# Patient Record
Sex: FEMALE | HISPANIC OR LATINO | Employment: FULL TIME | URBAN - METROPOLITAN AREA
[De-identification: names, ages, dates, MRNs, and addresses within clinical notes are randomized per-mention and may not be internally consistent; named-entity substitution may affect disease eponyms.]

---

## 2023-05-08 ENCOUNTER — HOSPITAL ENCOUNTER (EMERGENCY)
Facility: HOSPITAL | Age: 33
Discharge: HOME | End: 2023-05-08
Attending: EMERGENCY MEDICINE

## 2023-05-08 VITALS
OXYGEN SATURATION: 99 % | DIASTOLIC BLOOD PRESSURE: 80 MMHG | HEART RATE: 127 BPM | RESPIRATION RATE: 18 BRPM | SYSTOLIC BLOOD PRESSURE: 141 MMHG | TEMPERATURE: 98.6 F

## 2023-05-08 DIAGNOSIS — J02.9 PHARYNGITIS: Primary | ICD-10-CM

## 2023-05-08 LAB — S PYO DNA THROAT QL NAA+PROBE: NOT DETECTED

## 2023-05-08 RX ORDER — AMOXICILLIN 500 MG/1
500 CAPSULE ORAL EVERY 12 HOURS SCHEDULED
Qty: 20 CAPSULE | Refills: 0 | Status: SHIPPED | OUTPATIENT
Start: 2023-05-08 | End: 2023-05-18

## 2023-05-08 NOTE — ED PROVIDER NOTES
History  Chief Complaint   Patient presents with   • Sore Throat     Sore throat since yesterday  Denies fevers  71-year-old female presenting today with a sore throat that began yesterday  No other symptoms currently  Able to eat and drink without difficulty, pain worsens with swallowing  Denies nausea, vomiting, headache, cough, congestion, shortness of breath, fevers  Differential includes but is not limited to viral illness, strep pharyngitis  None       Past Medical History:   Diagnosis Date   • Miscarriage        Past Surgical History:   Procedure Laterality Date   •  SECTION         History reviewed  No pertinent family history  I have reviewed and agree with the history as documented  E-Cigarette/Vaping   • E-Cigarette Use Never User      E-Cigarette/Vaping Substances   • Nicotine No    • THC No    • CBD No    • Flavoring No    • Other No    • Unknown No      Social History     Tobacco Use   • Smoking status: Former     Packs/day: 0 20     Types: Cigarettes   • Smokeless tobacco: Never   Vaping Use   • Vaping Use: Never used   Substance Use Topics   • Alcohol use: Yes     Comment: social   • Drug use: No       Review of Systems   Constitutional: Negative for appetite change, chills and fever  HENT: Positive for sore throat  Negative for congestion, dental problem, ear pain, facial swelling, mouth sores, postnasal drip, sinus pressure and trouble swallowing  Eyes: Negative  Respiratory: Negative  Negative for cough, chest tightness, shortness of breath and wheezing  Cardiovascular: Negative  Negative for chest pain  Gastrointestinal: Negative for abdominal distention, abdominal pain, blood in stool, constipation, diarrhea, nausea and vomiting  Musculoskeletal: Negative for arthralgias, neck pain and neck stiffness  Skin: Negative  Negative for rash  Neurological: Negative for facial asymmetry and headaches     All other systems reviewed and are negative  Physical Exam  Physical Exam  Vitals and nursing note reviewed  Constitutional:       General: She is not in acute distress  Appearance: She is well-developed  She is not diaphoretic  HENT:      Head: Normocephalic and atraumatic  Right Ear: External ear normal       Left Ear: External ear normal       Nose: Nose normal       Mouth/Throat:      Pharynx: No oropharyngeal exudate  Eyes:      General: No scleral icterus  Right eye: No discharge  Left eye: No discharge  Conjunctiva/sclera: Conjunctivae normal       Pupils: Pupils are equal, round, and reactive to light  Neck:      Trachea: No tracheal deviation  Comments: Bilateral cervical adenopathy noted  Cardiovascular:      Rate and Rhythm: Normal rate and regular rhythm  Heart sounds: Normal heart sounds  No murmur heard  No friction rub  Pulmonary:      Effort: Pulmonary effort is normal  No respiratory distress  Breath sounds: Normal breath sounds  No stridor  No wheezing or rales  Chest:      Chest wall: No tenderness  Abdominal:      General: Bowel sounds are normal  There is no distension  Palpations: Abdomen is soft  Tenderness: There is no abdominal tenderness  There is no guarding or rebound  Musculoskeletal:      Cervical back: Normal range of motion and neck supple  Lymphadenopathy:      Cervical: Cervical adenopathy present  Skin:     General: Skin is warm and dry  Coloration: Skin is not pale  Findings: No erythema or rash  Comments: No sandpaper rash noted  No other rashes noted  Good coloration of skin            Vital Signs  ED Triage Vitals [05/08/23 0836]   Temperature Pulse Respirations Blood Pressure SpO2   98 6 °F (37 °C) (!) 127 18 141/80 99 %      Temp Source Heart Rate Source Patient Position - Orthostatic VS BP Location FiO2 (%)   Oral Monitor Lying Right arm --      Pain Score       --           Vitals:    05/08/23 0836   BP: 141/80   Pulse: (!) 127   Patient Position - Orthostatic VS: Lying         Visual Acuity      ED Medications  Medications - No data to display    Diagnostic Studies  Results Reviewed     Procedure Component Value Units Date/Time    Strep KAYLEY PCR [[id number]] Collected: 05/08/23 0852    Lab Status: In process Specimen: Throat Updated: 05/08/23 0854                 No orders to display              Procedures  Procedures         ED Course  ED Course as of 05/08/23 0856   Mon May 08, 2023   0853  patient states she gets nervous, this is normal for her according to patient                                SBIRT 22yo+    Flowsheet Row Most Recent Value   Initial Alcohol Screen: US AUDIT-C     1  How often do you have a drink containing alcohol? 0 Filed at: 05/08/2023 0838   Audit-C Score 0 Filed at: 05/08/2023 0723                    Medical Decision Making  Patient appears well no distress however there is large concern for strep pharyngitis, will swab as patient has children at home  However will treat with amoxicillin given suspicion  Patient is informed to return to the emergency department for worsening of symptoms and was given proper education regarding their diagnosis and symptoms  Otherwise the patient is informed to follow up with their primary care doctor for re-evaluation  The patient verbalizes understanding and agrees with above assessment and plan  All questions were answered  Please Note: Fluency Direct voice recognition software may have been used in the creation of this document  Wrong words or sound a like substitutions may have occurred due to the inherent limitations of the voice software  Pharyngitis: acute illness or injury  Amount and/or Complexity of Data Reviewed  Labs: ordered  Risk  OTC drugs  Prescription drug management            Disposition  Final diagnoses:   Pharyngitis     Time reflects when diagnosis was documented in both MDM as applicable and the Disposition within this note     Time User Action Codes Description Comment    5/8/2023  8:52 AM Jeff Luna Add [J02 9] Pharyngitis       ED Disposition     ED Disposition   Discharge    Condition   Stable    Date/Time   Mon May 8, 2023  8:52 AM    Comment   Gemma Lee discharge to home/self care  Follow-up Information     Follow up With Specialties Details Why Contact Info Additional [address] Emergency Department Emergency Medicine Go to  If symptoms worsen, otherwise please follow up with your family doctor [address] [place] [address] [address] [phone] [address] Emergency Department, [place], [place], [address]          Patient's Medications   Discharge Prescriptions    AMOXICILLIN (AMOXIL) 500 MG CAPSULE    Take 1 capsule (500 mg total) by mouth every 12 (twelve) hours for 10 days       Start Date: 5/8/2023  End Date: 5/18/2023       Order Dose: 500 mg       Quantity: 20 capsule    Refills: 0       No discharge procedures on file      PDMP Review     None          ED Provider  Electronically Signed by           Amrita Lee PA-C  05/08/23 6382

## 2024-03-10 ENCOUNTER — HOSPITAL ENCOUNTER (EMERGENCY)
Facility: HOSPITAL | Age: 34
Discharge: HOME | End: 2024-03-10
Attending: EMERGENCY MEDICINE | Admitting: EMERGENCY MEDICINE

## 2024-03-10 VITALS
SYSTOLIC BLOOD PRESSURE: 145 MMHG | TEMPERATURE: 99 F | HEART RATE: 103 BPM | BODY MASS INDEX: 30.38 KG/M2 | RESPIRATION RATE: 18 BRPM | OXYGEN SATURATION: 97 % | DIASTOLIC BLOOD PRESSURE: 88 MMHG | WEIGHT: 177 LBS

## 2024-03-10 DIAGNOSIS — J02.9 PHARYNGITIS: Primary | ICD-10-CM

## 2024-03-10 LAB — S PYO DNA THROAT QL NAA+PROBE: NOT DETECTED

## 2024-03-10 PROCEDURE — 99283 EMERGENCY DEPT VISIT LOW MDM: CPT

## 2024-03-10 PROCEDURE — 99284 EMERGENCY DEPT VISIT MOD MDM: CPT | Performed by: EMERGENCY MEDICINE

## 2024-03-10 PROCEDURE — 87651 STREP A DNA AMP PROBE: CPT | Performed by: EMERGENCY MEDICINE

## 2024-03-10 RX ORDER — IBUPROFEN 400 MG/1
400 TABLET ORAL ONCE
Status: COMPLETED | OUTPATIENT
Start: 2024-03-10 | End: 2024-03-10

## 2024-03-10 RX ORDER — ACETAMINOPHEN 325 MG/1
975 TABLET ORAL ONCE
Status: COMPLETED | OUTPATIENT
Start: 2024-03-10 | End: 2024-03-10

## 2024-03-10 RX ADMIN — ACETAMINOPHEN 975 MG: 325 TABLET ORAL at 11:21

## 2024-03-10 RX ADMIN — IBUPROFEN 400 MG: 400 TABLET, FILM COATED ORAL at 11:21

## 2024-03-10 RX ADMIN — DEXAMETHASONE SODIUM PHOSPHATE 10 MG: 10 INJECTION, SOLUTION INTRAMUSCULAR; INTRAVENOUS at 11:21

## 2024-03-10 NOTE — DISCHARGE INSTRUCTIONS
Follow-up with primary care for further care. Contact info provided below if needed.  Use over the counter medications as stated on the bottle as needed for pain control.  Stay hydrated.   Return to the ED with new or worsening symptoms.

## 2024-03-10 NOTE — ED PROVIDER NOTES
History  Chief Complaint   Patient presents with    Sore Throat     Sore throat since Friday, worse yesterday. One of children tested positive for strep A earlier.     Pt is a 34yo F who presents for sore throat.  Patient reports it started 2 days ago but acutely worsened yesterday.  Patient reports it is medial and bilateral.  She denies any increased swelling or pain unilaterally.  She states that it is painful to swallow but she is able to tolerate p.o. and secretions.  Patient denies any difficulty breathing.  Patient denies any fevers or chills.  Patient reports her daughter had strep 1 to 2 weeks ago and she is concerned she may have strep throat now as well.  Patient states she is otherwise healthy.        None       Past Medical History:   Diagnosis Date    Miscarriage        Past Surgical History:   Procedure Laterality Date     SECTION         History reviewed. No pertinent family history.  I have reviewed and agree with the history as documented.    E-Cigarette/Vaping    E-Cigarette Use Never User      E-Cigarette/Vaping Substances    Nicotine No     THC No     CBD No     Flavoring No     Other No     Unknown No      Social History     Tobacco Use    Smoking status: Former     Current packs/day: 0.20     Types: Cigarettes    Smokeless tobacco: Never   Vaping Use    Vaping status: Never Used   Substance Use Topics    Alcohol use: Yes     Comment: social    Drug use: No       Review of Systems   HENT:  Positive for sore throat.    All other systems reviewed and are negative.      Physical Exam  Physical Exam  Vitals reviewed.   Constitutional:       General: She is not in acute distress.     Appearance: She is well-developed. She is not toxic-appearing or diaphoretic.   HENT:      Head: Normocephalic and atraumatic.      Right Ear: External ear normal.      Left Ear: External ear normal.      Nose: Nose normal.      Mouth/Throat:      Mouth: Mucous membranes are moist.      Tongue: Tongue does not  deviate from midline.      Pharynx: No uvula swelling.      Tonsils: Tonsillar exudate present. No tonsillar abscesses. 2+ on the right. 2+ on the left.   Eyes:      Extraocular Movements: Extraocular movements intact.      Pupils: Pupils are equal, round, and reactive to light.   Cardiovascular:      Rate and Rhythm: Normal rate and regular rhythm.      Heart sounds: Normal heart sounds. No murmur heard.  Pulmonary:      Effort: Pulmonary effort is normal. No respiratory distress.      Breath sounds: Normal breath sounds. No wheezing.   Abdominal:      General: There is no distension.      Palpations: Abdomen is soft.      Tenderness: There is no abdominal tenderness.   Musculoskeletal:         General: Normal range of motion.      Cervical back: Normal range of motion and neck supple.      Right lower leg: No edema.      Left lower leg: No edema.   Skin:     General: Skin is warm and dry.      Capillary Refill: Capillary refill takes less than 2 seconds.      Coloration: Skin is not pale.      Findings: No erythema or rash.   Neurological:      General: No focal deficit present.      Mental Status: She is alert and oriented to person, place, and time.   Psychiatric:         Speech: Speech normal.         Behavior: Behavior is cooperative.         Vital Signs  ED Triage Vitals   Temperature Pulse Respirations Blood Pressure SpO2   03/10/24 1034 03/10/24 1034 03/10/24 1034 03/10/24 1034 03/10/24 1038   99 °F (37.2 °C) 103 18 145/88 97 %      Temp Source Heart Rate Source Patient Position - Orthostatic VS BP Location FiO2 (%)   03/10/24 1034 03/10/24 1034 03/10/24 1034 03/10/24 1034 --   Oral Monitor Lying Right arm       Pain Score       03/10/24 1121       5           Vitals:    03/10/24 1034   BP: 145/88   Pulse: 103   Patient Position - Orthostatic VS: Lying         Visual Acuity      ED Medications  Medications   acetaminophen (TYLENOL) tablet 975 mg (975 mg Oral Given 3/10/24 1121)   ibuprofen (MOTRIN) tablet  400 mg (400 mg Oral Given 3/10/24 1121)   dexamethasone oral liquid 10 mg 1 mL (10 mg Oral Given 3/10/24 1121)       Diagnostic Studies  Results Reviewed       Procedure Component Value Units Date/Time    Strep A PCR [[id number]]  (Normal) Collected: 03/10/24 1040    Lab Status: Final result Specimen: Throat Updated: 03/10/24 1114     STREP A PCR Not Detected                   No orders to display              Procedures  Procedures         ED Course  ED Course as of 03/10/24 1124   Sun Mar 10, 2024   1114 STREP A PCR: Not Detected  Negative.    1120 Pt made aware of results and plan for DC after symptomatic treatment. Pt agreeable.                                SBIRT 20yo+      Flowsheet Row Most Recent Value   Initial Alcohol Screen: US AUDIT-C     1. How often do you have a drink containing alcohol? 0 Filed at: 03/10/2024 1037   Audit-C Score 0 Filed at: 03/10/2024 1037   MARTHA: How many times in the past year have you...    Used an illegal drug or used a prescription medication for non-medical reasons? Never Filed at: 03/10/2024 1037                      Medical Decision Making  Pt is a 34yo F who presents with sore throat. Exam pertinent for tonsillar hypertrophy with exudate without evidence of abscess.    Differential diagnosis to include but not limited to viral versus bacterial pharyngitis, abscess.  No evidence of abscess on exam.  Will swab for strep today.  Will treat symptomatically and reassess.  See ED course for results and details.    Plan to discharge pt with f/u to PCP. Discussed returning the ED with new or worsening of symptoms. Discussed use of over the counter medications as stated on the bottle as needed for pain. Pt expressed understanding of discharge instructions, return precautions, and medication instructions and is stable for discharge at this time. All questions were answered and pt was discharged without incident.       Amount and/or Complexity of Data Reviewed  Labs: ordered.  Decision-making details documented in ED Course.    Risk  OTC drugs.  Prescription drug management.             Disposition  Final diagnoses:   Pharyngitis     Time reflects when diagnosis was documented in both MDM as applicable and the Disposition within this note       Time User Action Codes Description Comment    3/10/2024 11:16 AM Armida Cosby Add [J02.9] Pharyngitis           ED Disposition       ED Disposition   Discharge    Condition   Stable    Date/Time   Sun Mar 10, 2024 1116    Comment   Gemma Lobb discharge to home/self care.                   Follow-up Information       Follow up With Specialties Details Why Contact Info    Esha Smith MD  Call on 3/11/2024  [address] [place] [address]  [phone]              Patient's Medications    No medications on file       No discharge procedures on file.    PDMP Review       None            ED Provider  Electronically Signed by             Armida Cosby MD  03/10/24 1122

## 2025-02-27 ENCOUNTER — TELEPHONE (OUTPATIENT)
Dept: OBGYN CLINIC | Facility: CLINIC | Age: 35
End: 2025-02-27

## 2025-02-27 NOTE — TELEPHONE ENCOUNTER
Left message for pt to call back to confirm appt on 4/21 at 830 am in [place] for Dating and Viability scan.

## 2025-03-18 ENCOUNTER — TELEPHONE (OUTPATIENT)
Age: 35
End: 2025-03-18

## 2025-03-18 NOTE — TELEPHONE ENCOUNTER
NP OB Approx 6wk--Patient complains of nausea beginning this  past weekend. Denies vomiting. Patient tolerating sips of fluids and small meals. Patient inquiring about OTC nausea treatments. Reviewed meds from pregnancy essentials guide. Essentials guide messaged to patient on MyChart.

## 2025-03-27 ENCOUNTER — TELEPHONE (OUTPATIENT)
Age: 35
End: 2025-03-27

## 2025-03-27 NOTE — TELEPHONE ENCOUNTER
"LMP 2/1/25 7 weeks 5 days   Patient states she is very nauseated, trying B6   We discussed B6 and Unisom dosages how to take meds. Increase fluids as much as possible: start with small frequent sips cold water, use electrolyte replacements such as Gatorade/ Liquid IV/ Pedialyte, cold pops.  Increase to small bland meals when tolerating fluids: rice, toast, crackers, plain noodles. No dairy, no citrus, no spices.   Seek ED of unable to urinate, unable to tolerate fluids, vomiting or any other concerning symptoms. Patient verbalzied understanding.   Snack often, and eat small meals - The best foods to eat have lots of protein or carbohydrates, but not a lot of fat. Good choices are crackers, bread, and low-fat yogurt. Avoid spicy foods.   Drink cold, clear beverages that are either fizzy or sour - Good choices are lemonade and ginger ale.   Suck on ginger-flavored lollipops.   Smell fresh lemon, mint, or orange.   Brush your teeth right after you eat.   Do not lie down right after you eat.   Take your vitamins at bedtime with a snack, not in the morning. If your vitamin contains iron, it might help to switch to a vitamin without iron.   Avoid things that make you feel sick - That might include stuffy rooms, strong smells, hot places, loud noises, or not sleeping enough. Try to figure out if some foods and drinks stay down better than others. Avoid foods and drinks that seem to make you feel sick. This is different for different people.   \"Acupressure\" bands - These are bands you can wear on your wrists. They are supposed to reduce morning or motion sickness. There is not a lot of evidence that these work, but some people feel better if they wear them.          "

## 2025-04-01 NOTE — PROGRESS NOTES
S: 34 y.o.   who presents for viability scan with LMP of 25. She is 8 weeks and 4 days by her LMP. She reports some nausea but otherwise doing ok. She denies cramping or vaginal bleeding.     Past Medical History:   Diagnosis Date   • Miscarriage        OB History    Para Term  AB Living   5 2 2  2 2   SAB IAB Ectopic Multiple Live Births   2    2      # Outcome Date GA Lbr Kuldeep/2nd Weight Sex Type Anes PTL Lv   5 Current            4 Term 10/04/18     CS-Unspec   ADY   3 SAB 2017 7w0d             Birth Comments: no D&C   2 2014 8w0d             Birth Comments: no D&C   1 Term 13 38w0d  3374 g (7 lb 7 oz)  CS-LTranv  N ADY      Birth Comments: Induced for PreEclampsia; C/S for NR FHR, nuchal cord      Complications: Pre-eclampsia        O:  Vitals:    25 1033   BP: 120/80   Weight: 85.3 kg (188 lb)        TVUS: viable, rodríguez IUP at 8 weeks 4 days with CRL 21mm. FHT 182bpm. MIGDALIA 25. Final dating via LMP.                            A/P:  #1. IUP at 8 weeks and 4 days  - Viable pregnancy on TVUS  - RTC in 1-2 week for nurse intake visit    Problem List Items Addressed This Visit    None  Visit Diagnoses       Amenorrhea    -  Primary    Relevant Orders    [facility] US OB < 14 weeks single or first gestation level 1 (Completed)      Pregnancy at early stage        Relevant Orders    Ambulatory Referral to Maternal Fetal Medicine          Jeni Leonard PA-C  OB/GYN  2025  11:04 AM

## 2025-04-02 ENCOUNTER — ULTRASOUND (OUTPATIENT)
Dept: OBGYN CLINIC | Facility: CLINIC | Age: 35
End: 2025-04-02
Payer: COMMERCIAL

## 2025-04-02 VITALS — SYSTOLIC BLOOD PRESSURE: 120 MMHG | DIASTOLIC BLOOD PRESSURE: 80 MMHG | WEIGHT: 188 LBS | BODY MASS INDEX: 32.27 KG/M2

## 2025-04-02 DIAGNOSIS — N91.2 AMENORRHEA: Primary | ICD-10-CM

## 2025-04-02 DIAGNOSIS — Z34.90 PREGNANCY AT EARLY STAGE: ICD-10-CM

## 2025-04-02 PROCEDURE — 99214 OFFICE O/P EST MOD 30 MIN: CPT | Performed by: PHYSICIAN ASSISTANT

## 2025-04-02 PROCEDURE — 76817 TRANSVAGINAL US OBSTETRIC: CPT | Performed by: PHYSICIAN ASSISTANT

## 2025-04-02 RX ORDER — PYRIDOXINE HCL (VITAMIN B6) 25 MG
25 TABLET ORAL DAILY
COMMUNITY

## 2025-04-07 ENCOUNTER — PATIENT MESSAGE (OUTPATIENT)
Dept: OBGYN CLINIC | Facility: CLINIC | Age: 35
End: 2025-04-07

## 2025-04-07 ENCOUNTER — INITIAL PRENATAL (OUTPATIENT)
Dept: OBGYN CLINIC | Facility: CLINIC | Age: 35
End: 2025-04-07
Payer: COMMERCIAL

## 2025-04-07 VITALS — HEIGHT: 64 IN | WEIGHT: 180 LBS | BODY MASS INDEX: 30.73 KG/M2

## 2025-04-07 DIAGNOSIS — Z86.32 HX OF GESTATIONAL DIABETES IN PRIOR PREGNANCY, CURRENTLY PREGNANT: ICD-10-CM

## 2025-04-07 DIAGNOSIS — Z34.81 PRENATAL CARE, SUBSEQUENT PREGNANCY, FIRST TRIMESTER: Primary | ICD-10-CM

## 2025-04-07 DIAGNOSIS — Z87.59 HISTORY OF PRE-ECLAMPSIA: ICD-10-CM

## 2025-04-07 DIAGNOSIS — O09.299 HX OF GESTATIONAL DIABETES IN PRIOR PREGNANCY, CURRENTLY PREGNANT: ICD-10-CM

## 2025-04-07 PROCEDURE — 99211 OFF/OP EST MAY X REQ PHY/QHP: CPT

## 2025-04-07 NOTE — PATIENT INSTRUCTIONS
Congratulations!! Please review our Pregnancy Essential Guide and [facility] L&D Virtual tour from our networks website.     [facility] Pregnancy Essentials Guide  [facility] Women[facility] ([email or web address])     [facility] - Virtual Tour ([email or web address])     Check out “Baby & Me Hospital Readiness Class” from [facility] on [employer].   The video is available for your viewing pleasure at [email or web address]

## 2025-04-07 NOTE — PROGRESS NOTES
OB INTAKE INTERVIEW  Patient is 34 y.o. who presents for OB intake at 9 wks  She is accompanied by herself during this encounter  The father of her baby (Evans Lee) is involved in the pregnancy and is 32 years old.      Last Menstrual Period: 25  Ultrasound: Measured 8 weeks 4 days on   Estimated Date of Delivery: 25 confirmed by 8 week US    Signs/Symptoms of Pregnancy  Current pregnancy symptoms: nausea, fatigue  Constipation YES  Headaches no  Cramping/spotting no  PICA cravings no    Diabetes-  Body mass index is 30.9 kg/m².  If patient has 1 or more, please order early 1 hour GTT  History of GDM YES  BMI >35 no  History of PCOS or current metformin use (should stop for 7 days prior to 1hr GTT unless pre-existing diabetes)  no  History of LGA/macrosomic infant (4000g/9lbs) no    If patient has 2 or more, please order early 1 hour GTT  BMI>30 YES  AMA no  First degree relative with type 2 diabetes no  History of chronic HTN, hyperlipidemia, elevated A1C no  High risk race (, , ,  or ) no    Hypertension- if you answer yes to any of the following, please order baseline preeclampsia labs (cbc, comprehensive metabolic panel, urine protein creatinine ratio, uric acid)  History of of chronic HTN no  History of gestational HTN no  History of preeclampsia, eclampsia, or HELLP syndrome YES  History of diabetes no  History of lupus,sjogrens syndrome, kidney disease no    Thyroid- if yes order TSH with reflex T4  History of thyroid disease no    Bleeding Disorder or Hx of DVT-patient or first degree relative with history of. Order the following if not done previously.   (Factor V, antithrombin III, prothrombin gene mutation, protein C and S Ag, lupus anticoagulant, anticardiolipin, beta-2 glycoprotein)   no    OB/GYN-  History of abnormal pap smear YES       Date of last pap smear 2019  History of HPV no  History of Herpes/HSV no  History of other  STI (gonorrhea, chlamydia, trich) no  History of prior  no  History of prior  YES  History of  delivery prior to 36 weeks 6 days no  History of Varicella or Vaccination had vaccines  History of blood transfusion no  Ok for blood transfusion yes    Substance screening-   History of tobacco use YES  Currently using tobacco no  Substance Use Screen Level (N/A, LOW, HIGH) NA    MRSA Screening-   Does the pt have a hx of MRSA? no    Immunizations:  Influenza vaccine given this season not in   Discussed Tdap vaccine yes  Discussed COVID Vaccine no    Genetic/MFM-  Do you or your partner have a history of any of the following in yourselves or first degree relatives?  Cystic fibrosis no  Spinal muscular atrophy no  Hemoglobinopathy/Sickle Cell/Thalassemia no  Fragile X Intellectual Disability no    If yes, discuss Carrier Screening and recommend consultation with [facility]/Genetic Counseling and place specific [facility] Referral for.    If no, discuss Carrier Screening being completed once in a lifetime as a standard of care lab test. Place orders for Cystic Fibrosis Gene Test (QNS165) and Spinal Muscular Atrophy DNA ([id number])      Appointment for Nuchal Translucency Ultrasound at [facility] scheduled for       Interview education  [facility] Pregnancy Essentials Book reviewed, discussed and attached to their AVS yes    Nurse/Family Partnership- patient may qualify no; referral placed no    Prenatal lab work scripts yes  Extra labs ordered:  1 hr gtt  PreE    Aspirin/Preeclampsia Screen    Risk Level Risk Factor Recommendation   LOW Prior Uncomplicated full-term delivery YES No Aspirin recommendation        MODERATE Nulliparity no Recommend low-dose aspirin if     BMI>30 YES 2 or more moderate risk factors    Family History Preeclampsia (mother/sister) no     35yr old or greater no     Black Race, Concern for SDOH/Low Socioeconomic no     IVF Pregnancy  no     Personal History Risks (low birth weight, prior adverse  preg outcome, >10yr preg interval) no         HIGH History of Preeclampsia YES Recommend low-dose aspirin if     Multifetal gestation no 1 or more high risk factors    Chronic HTN no     Type 1 or 2 Diabetes no     Renal Disease no     Autoimmune Disease  no      Contraindications to ASA therapy:  NSAID/ ASA allergy: no  Nasal polyps: no  Asthma with history of ASA induced bronchospasm: no  Relative contraindications:  History of GI bleed: no  Active peptic ulcer disease: no  Severe hepatic dysfunction: no    Patient should be recommended to take ASA 162mg during this pregnancy from 12-36wks to lower her risk of preeclampsia: High Risk Criteria - aware to start ASA therapy at 12 weeks.           The patient has a history now or in prior pregnancy notable for:  pre-clampsia, gestational DM, and EPDS- 5 hx of anxiety no medications.       Details that I feel the provider should be aware of: This is a planned and welcomed pregnancy for Gemma and her significant other Evans.  She is a new patient to [facility]. This is their 3rd child together, they  have 2 boys Evans (12) and Gaetano (6) both born via . She had gDM with both pregnancies and preE with the first. She is overall feeling well so far, some nausea and fatigue. Patient is aware of PN1 labs and to have them completed before her next appointment. Patient aware of early glucola testing and it to be repeated in 3rd Trimester if WNL during 1st.  Blue folder was  given and reviewed today.     PN1 visit scheduled. The patient was oriented to our practice, the navigator role, reviewed delivering physicians and [facility] for Delivery. All questions were answered.    Interviewed by: Francine Chua RN

## 2025-04-18 ENCOUNTER — APPOINTMENT (OUTPATIENT)
Dept: LAB | Facility: HOSPITAL | Age: 35
End: 2025-04-18
Attending: PHYSICIAN ASSISTANT
Payer: COMMERCIAL

## 2025-04-18 DIAGNOSIS — Z86.32 HX OF GESTATIONAL DIABETES IN PRIOR PREGNANCY, CURRENTLY PREGNANT: ICD-10-CM

## 2025-04-18 DIAGNOSIS — Z34.81 PRENATAL CARE, SUBSEQUENT PREGNANCY, FIRST TRIMESTER: ICD-10-CM

## 2025-04-18 DIAGNOSIS — Z87.59 HISTORY OF PRE-ECLAMPSIA: Primary | ICD-10-CM

## 2025-04-18 DIAGNOSIS — O09.299 HX OF GESTATIONAL DIABETES IN PRIOR PREGNANCY, CURRENTLY PREGNANT: ICD-10-CM

## 2025-04-18 LAB
ABO GROUP BLD: NORMAL
ALBUMIN SERPL BCG-MCNC: 3.8 G/DL (ref 3.5–5)
ALP SERPL-CCNC: 50 U/L (ref 34–104)
ALT SERPL W P-5'-P-CCNC: 22 U/L (ref 7–52)
ANION GAP SERPL CALCULATED.3IONS-SCNC: 9 MMOL/L (ref 4–13)
AST SERPL W P-5'-P-CCNC: 16 U/L (ref 13–39)
BACTERIA UR QL AUTO: ABNORMAL /HPF
BASOPHILS # BLD AUTO: 0.02 THOUSANDS/ÂΜL (ref 0–0.1)
BASOPHILS NFR BLD AUTO: 0 % (ref 0–1)
BILIRUB SERPL-MCNC: 0.3 MG/DL (ref 0.2–1)
BILIRUB UR QL STRIP: NEGATIVE
BLD GP AB SCN SERPL QL: NEGATIVE
BUN SERPL-MCNC: 5 MG/DL (ref 5–25)
CALCIUM SERPL-MCNC: 8.7 MG/DL (ref 8.4–10.2)
CHLORIDE SERPL-SCNC: 107 MMOL/L (ref 96–108)
CLARITY UR: CLEAR
CO2 SERPL-SCNC: 18 MMOL/L (ref 21–32)
COLOR UR: YELLOW
CREAT SERPL-MCNC: 0.45 MG/DL (ref 0.6–1.3)
CREAT UR-MCNC: 146 MG/DL
EOSINOPHIL # BLD AUTO: 0.06 THOUSAND/ÂΜL (ref 0–0.61)
EOSINOPHIL NFR BLD AUTO: 1 % (ref 0–6)
ERYTHROCYTE [DISTWIDTH] IN BLOOD BY AUTOMATED COUNT: 12.6 % (ref 11.6–15.1)
GFR SERPL CREATININE-BSD FRML MDRD: 132 ML/MIN/1.73SQ M
GLUCOSE 1H P 50 G GLC PO SERPL-MCNC: 143 MG/DL (ref 70–134)
GLUCOSE SERPL-MCNC: 143 MG/DL (ref 65–140)
GLUCOSE UR STRIP-MCNC: ABNORMAL MG/DL
HBV SURFACE AB SER-ACNC: 11.5 MIU/ML
HBV SURFACE AG SER QL: NORMAL
HCT VFR BLD AUTO: 39.6 % (ref 34.8–46.1)
HCV AB SER QL: NORMAL
HGB BLD-MCNC: 13.2 G/DL (ref 11.5–15.4)
HGB UR QL STRIP.AUTO: NEGATIVE
HIV 1+2 AB+HIV1 P24 AG SERPL QL IA: NORMAL
IMM GRANULOCYTES # BLD AUTO: 0.01 THOUSAND/UL (ref 0–0.2)
IMM GRANULOCYTES NFR BLD AUTO: 0 % (ref 0–2)
KETONES UR STRIP-MCNC: NEGATIVE MG/DL
LEUKOCYTE ESTERASE UR QL STRIP: NEGATIVE
LYMPHOCYTES # BLD AUTO: 1.24 THOUSANDS/ÂΜL (ref 0.6–4.47)
LYMPHOCYTES NFR BLD AUTO: 17 % (ref 14–44)
MCH RBC QN AUTO: 28.9 PG (ref 26.8–34.3)
MCHC RBC AUTO-ENTMCNC: 33.3 G/DL (ref 31.4–37.4)
MCV RBC AUTO: 87 FL (ref 82–98)
MONOCYTES # BLD AUTO: 0.4 THOUSAND/ÂΜL (ref 0.17–1.22)
MONOCYTES NFR BLD AUTO: 6 % (ref 4–12)
NEUTROPHILS # BLD AUTO: 5.57 THOUSANDS/ÂΜL (ref 1.85–7.62)
NEUTS SEG NFR BLD AUTO: 76 % (ref 43–75)
NITRITE UR QL STRIP: NEGATIVE
NON-SQ EPI CELLS URNS QL MICRO: ABNORMAL /HPF
NRBC BLD AUTO-RTO: 0 /100 WBCS
PH UR STRIP.AUTO: 5.5 [PH]
PLATELET # BLD AUTO: 273 THOUSANDS/UL (ref 149–390)
PMV BLD AUTO: 9.5 FL (ref 8.9–12.7)
POTASSIUM SERPL-SCNC: 3.3 MMOL/L (ref 3.5–5.3)
PROT SERPL-MCNC: 7.1 G/DL (ref 6.4–8.4)
PROT UR STRIP-MCNC: ABNORMAL MG/DL
PROT UR-MCNC: 20.3 MG/DL
PROT/CREAT UR: 0.1 MG/G{CREAT}
RBC # BLD AUTO: 4.56 MILLION/UL (ref 3.81–5.12)
RBC #/AREA URNS AUTO: ABNORMAL /HPF
RH BLD: POSITIVE
RUBV IGG SERPL IA-ACNC: <10 IU/ML
SODIUM SERPL-SCNC: 134 MMOL/L (ref 135–147)
SP GR UR STRIP.AUTO: >=1.03 (ref 1–1.03)
SPECIMEN EXPIRATION DATE: NORMAL
TREPONEMA PALLIDUM IGG+IGM AB [PRESENCE] IN SERUM OR PLASMA BY IMMUNOASSAY: NORMAL
URATE SERPL-MCNC: 2.2 MG/DL (ref 2–7.5)
UROBILINOGEN UR STRIP-ACNC: <2 MG/DL
WBC # BLD AUTO: 7.3 THOUSAND/UL (ref 4.31–10.16)
WBC #/AREA URNS AUTO: ABNORMAL /HPF

## 2025-04-18 PROCEDURE — 81001 URINALYSIS AUTO W/SCOPE: CPT

## 2025-04-18 PROCEDURE — 86900 BLOOD TYPING SEROLOGIC ABO: CPT

## 2025-04-18 PROCEDURE — 87086 URINE CULTURE/COLONY COUNT: CPT

## 2025-04-18 PROCEDURE — 86803 HEPATITIS C AB TEST: CPT

## 2025-04-18 PROCEDURE — 85025 COMPLETE CBC W/AUTO DIFF WBC: CPT

## 2025-04-18 PROCEDURE — 86901 BLOOD TYPING SEROLOGIC RH(D): CPT

## 2025-04-18 PROCEDURE — 82950 GLUCOSE TEST: CPT

## 2025-04-18 PROCEDURE — 84156 ASSAY OF PROTEIN URINE: CPT

## 2025-04-18 PROCEDURE — 36415 COLL VENOUS BLD VENIPUNCTURE: CPT

## 2025-04-18 PROCEDURE — 86780 TREPONEMA PALLIDUM: CPT

## 2025-04-18 PROCEDURE — 82570 ASSAY OF URINE CREATININE: CPT

## 2025-04-18 PROCEDURE — 87340 HEPATITIS B SURFACE AG IA: CPT

## 2025-04-18 PROCEDURE — 87389 HIV-1 AG W/HIV-1&-2 AB AG IA: CPT

## 2025-04-18 PROCEDURE — 86850 RBC ANTIBODY SCREEN: CPT

## 2025-04-18 PROCEDURE — 80053 COMPREHEN METABOLIC PANEL: CPT

## 2025-04-18 PROCEDURE — 86762 RUBELLA ANTIBODY: CPT

## 2025-04-18 PROCEDURE — 86706 HEP B SURFACE ANTIBODY: CPT

## 2025-04-18 PROCEDURE — 84550 ASSAY OF BLOOD/URIC ACID: CPT

## 2025-04-19 LAB — BACTERIA UR CULT: NORMAL

## 2025-04-23 ENCOUNTER — RESULTS FOLLOW-UP (OUTPATIENT)
Dept: LABOR AND DELIVERY | Facility: HOSPITAL | Age: 35
End: 2025-04-23

## 2025-04-23 DIAGNOSIS — R73.09 ABNORMAL GLUCOSE: Primary | ICD-10-CM

## 2025-04-24 PROBLEM — Z86.32 HISTORY OF GESTATIONAL DIABETES MELLITUS (GDM): Status: ACTIVE | Noted: 2025-04-24

## 2025-04-24 PROBLEM — Z34.81 PRENATAL CARE, SUBSEQUENT PREGNANCY IN FIRST TRIMESTER: Status: ACTIVE | Noted: 2025-04-24

## 2025-04-24 NOTE — PROGRESS NOTES
OB/GYN  PN Visit  Gemma Lee  [id number]  2025  2:26 PM  CASSI Ibrahim    S: 34 y.o.  11w6d here for PN visit. Her pregnancy is complicated by listed below.    She denies contractions. She denies leakage of fluid and vaginal bleeding.   She denies  fetal movement.   She denies vomiting, headache, cramping, edema, and smoking.   Patient feels safe at home.    She reports occasional nausea and headaches.       O:  Pre- Vitals      Flowsheet Row Most Recent Value   Prenatal Assessment    Fetal Heart Rate 165   Prenatal Vitals    Blood Pressure 120/82   Weight - Scale 84.6 kg (186 lb 9.6 oz)   Urine Albumin/Glucose    Dilation/Effacement/Station    Vaginal Drainage    Edema           Wt=84.6 kg (186 lb 9.6 oz); Body mass index is 32.03 kg/m².; TWG=2.994 kg (6 lb 9.6 oz)    General: Well appearing, no distress.  OB exam completed: fundal height, +FHT.  Urine: -/-     A/P:    Problem List Items Addressed This Visit       Prenatal care, subsequent pregnancy in first trimester - Primary    - Continue PNV  - Labor precautions reviewed  - Fetal movement reviewed  - Labs: UTD, advised to complete 3 hr gtt  -Pap: collected with cultures  -Rh: A+  - Genetics: [facility]   - Ultrasounds: [facility]   - Tdap: 28 weeks  - Flu Shot: Will offer in season  - RSV:  Will offer during season (-) @ [id number]   - Rhogam: n/a  - Delivery: RLTCS  - Contraception: TBD  - Breastfeeding: TBD  - Pediatrician: TBD  -Delivery Consent & Packet: 30 weeks  -GBS: 36 weeks  - RTO in 4 weeks           Relevant Orders    IGP, Aptima HPV, Rfx 16/18,45    Ct, Ng, Trich vag by CHANELLE    History of gestational diabetes mellitus (GDM)    Early 1 hr abnormal, 3 hr ordered            CASSI Ibrahim  2025  2:26 PM

## 2025-04-24 NOTE — ASSESSMENT & PLAN NOTE
- Continue PNV  - Labor precautions reviewed  - Fetal movement reviewed  - Labs: UTD, advised to complete 3 hr gtt  -Pap: collected with cultures  -Rh: A+  - Genetics: [facility] 4/30  - Ultrasounds: [facility] 4/30  - Tdap: 28 weeks  - Flu Shot: Will offer in season  - RSV:  Will offer during season (9/1-1/31) @ [id number]   - Rhogam: n/a  - Delivery: RLTCS  - Contraception: TBD  - Breastfeeding: TBD  - Pediatrician: TBD  -Delivery Consent & Packet: 30 weeks  -GBS: 36 weeks  - RTO in 4 weeks

## 2025-04-25 ENCOUNTER — INITIAL PRENATAL (OUTPATIENT)
Dept: OBGYN CLINIC | Facility: CLINIC | Age: 35
End: 2025-04-25
Payer: COMMERCIAL

## 2025-04-25 VITALS — BODY MASS INDEX: 32.03 KG/M2 | WEIGHT: 186.6 LBS | SYSTOLIC BLOOD PRESSURE: 120 MMHG | DIASTOLIC BLOOD PRESSURE: 82 MMHG

## 2025-04-25 DIAGNOSIS — Z34.81 PRENATAL CARE, SUBSEQUENT PREGNANCY IN FIRST TRIMESTER: Primary | ICD-10-CM

## 2025-04-25 DIAGNOSIS — Z86.32 HISTORY OF GESTATIONAL DIABETES MELLITUS (GDM): ICD-10-CM

## 2025-04-25 PROCEDURE — 99213 OFFICE O/P EST LOW 20 MIN: CPT

## 2025-04-25 RX ORDER — ASPIRIN 81 MG/1
81 TABLET, CHEWABLE ORAL 2 TIMES DAILY
COMMUNITY

## 2025-04-27 ENCOUNTER — RESULTS FOLLOW-UP (OUTPATIENT)
Dept: OBGYN CLINIC | Facility: CLINIC | Age: 35
End: 2025-04-27

## 2025-04-27 ENCOUNTER — APPOINTMENT (OUTPATIENT)
Dept: LAB | Facility: HOSPITAL | Age: 35
End: 2025-04-27
Payer: COMMERCIAL

## 2025-04-27 DIAGNOSIS — R73.09 ABNORMAL GLUCOSE: ICD-10-CM

## 2025-04-27 LAB
C TRACH RRNA SPEC QL NAA+PROBE: NEGATIVE
GLUCOSE 1H P 100 G GLC PO SERPL-MCNC: 146 MG/DL (ref 65–179)
GLUCOSE 2H P 100 G GLC PO SERPL-MCNC: 137 MG/DL (ref 65–154)
GLUCOSE 3H P 100 G GLC PO SERPL-MCNC: 79 MG/DL (ref 65–139)
GLUCOSE P FAST SERPL-MCNC: 90 MG/DL (ref 65–94)
N GONORRHOEA RRNA SPEC QL NAA+PROBE: NEGATIVE
T VAGINALIS RRNA SPEC QL NAA+PROBE: NEGATIVE

## 2025-04-27 PROCEDURE — 36415 COLL VENOUS BLD VENIPUNCTURE: CPT

## 2025-04-27 PROCEDURE — 82952 GTT-ADDED SAMPLES: CPT

## 2025-04-27 PROCEDURE — 82951 GLUCOSE TOLERANCE TEST (GTT): CPT

## 2025-04-28 ENCOUNTER — RESULTS FOLLOW-UP (OUTPATIENT)
Dept: OBGYN CLINIC | Facility: CLINIC | Age: 35
End: 2025-04-28

## 2025-04-28 LAB
CYTOLOGIST CVX/VAG CYTO: NORMAL
DX ICD CODE: NORMAL
HPV GENOTYPE REFLEX: NORMAL
HPV I/H RISK 4 DNA CVX QL PROBE+SIG AMP: NEGATIVE
OTHER STN SPEC: NORMAL
PATH REPORT.FINAL DX SPEC: NORMAL
SL AMB NOTE:: NORMAL
SL AMB SPECIMEN ADEQUACY: NORMAL
SL AMB TEST METHODOLOGY: NORMAL

## 2025-04-30 ENCOUNTER — ROUTINE PRENATAL (OUTPATIENT)
Facility: HOSPITAL | Age: 35
End: 2025-04-30
Attending: PHYSICIAN ASSISTANT
Payer: COMMERCIAL

## 2025-04-30 VITALS
WEIGHT: 186.6 LBS | DIASTOLIC BLOOD PRESSURE: 70 MMHG | BODY MASS INDEX: 31.86 KG/M2 | SYSTOLIC BLOOD PRESSURE: 134 MMHG | HEIGHT: 64 IN | HEART RATE: 113 BPM

## 2025-04-30 DIAGNOSIS — Z36.82 ENCOUNTER FOR NUCHAL TRANSLUCENCY TESTING: ICD-10-CM

## 2025-04-30 DIAGNOSIS — O34.219 PREVIOUS CESAREAN DELIVERY, ANTEPARTUM: ICD-10-CM

## 2025-04-30 DIAGNOSIS — Z33.1 PREGNANT STATE, INCIDENTAL: ICD-10-CM

## 2025-04-30 DIAGNOSIS — Z36.0 ENCOUNTER FOR ANTENATAL SCREENING FOR CHROMOSOMAL ANOMALIES: ICD-10-CM

## 2025-04-30 DIAGNOSIS — Z3A.12 12 WEEKS GESTATION OF PREGNANCY: Primary | ICD-10-CM

## 2025-04-30 DIAGNOSIS — Z34.90 PREGNANCY AT EARLY STAGE: ICD-10-CM

## 2025-04-30 PROCEDURE — 76801 OB US < 14 WKS SINGLE FETUS: CPT | Performed by: OBSTETRICS & GYNECOLOGY

## 2025-04-30 PROCEDURE — 99203 OFFICE O/P NEW LOW 30 MIN: CPT | Performed by: OBSTETRICS & GYNECOLOGY

## 2025-04-30 PROCEDURE — 76813 OB US NUCHAL MEAS 1 GEST: CPT | Performed by: OBSTETRICS & GYNECOLOGY

## 2025-04-30 PROCEDURE — 36415 COLL VENOUS BLD VENIPUNCTURE: CPT | Performed by: OBSTETRICS & GYNECOLOGY

## 2025-04-30 NOTE — PROGRESS NOTES
"Gemma presents today for a genetic screening ultrasound.  This is her fifth pregnancy.  She has a history of 2 first trimester miscarriages not requiring surgical intervention.  She had 2 term  sections her first which was complicated by preeclampsia and A1 gestational diabetes.  Her second delivery in 2018 was also complicated by gestational diabetes not requiring medication.  She is currently taking low-dose aspirin to reduce risk for preeclampsia.  Early gestational diabetes screening was normal.    We discussed the options for genetic screening, including but not limited to first trimester screening, second trimester screening, combined first and second trimester screening, noninvasive prenatal screening (NIPS) for patients at high risk and diagnostic screening through the use of CVS and amniocentesis. We discussed the risks and benefits of each approach including the sensitivities and false positive rates as well as the difference between a screening test and a diagnostic test. At the conclusion of our discussion the patient elected noninvasive prenatal screening utilizing the MaterniT 21 plus test. The patient had this blood work drawn in the office.   The results should be available in approximately 7-10 days.    We discussed follow-up in detail and I recommend an anatomy ultrasound be scheduled for 20 weeks gestation.    Thank you very much for allowing us to participate in the care of this very nice patient. Should you have any questions, please do not hesitate to contact me.    Portions of the record may have been created with voice recognition software. Occasional wrong word or \"sound a like\" substitutions may have occurred due to the inherent limitations of voice recognition software. Read the chart carefully and recognize, using context, where substitutions have occurred.  "

## 2025-04-30 NOTE — PROGRESS NOTES
Patient chose to have [facility] [id number] Non-Invasive Prenatal Screen [id number] PLUS w/ SCA, WITH fetal sex.  Patient choose to be billed through insurance.     Patient given brochure and is aware [facility] will contact patient's insurance and coordinate coverage.  Provided [facility] contact information. General inquiries [phone], Cost estimates [phone] and [facility] Billing [phone]. Website [email or web address].     Blood collection tubes labeled with patient identifiers (name, medical record number, and date of birth).     Filled out [facility] order form. Patient chose to have blood drawn in our office at time of visit. NIPS was drawn from right arm with a butterfly needle by Michaelle BALL MA.       If patient chose to have blood work drawn at a [facility] lab we requested patient notify MFM (via phone call or [employer] message) when blood collected so office can follow up on results.       Maternal Fetal Medicine will have results in approximately 5-7 business days and will call patient or notify via [employer].  Patient aware viewing lab result online will reveal fetal sex if ordered.    Patient verbalized understanding of all instructions and no questions at this time.

## 2025-05-05 ENCOUNTER — RESULTS FOLLOW-UP (OUTPATIENT)
Dept: PERINATAL CARE | Facility: OTHER | Age: 35
End: 2025-05-05

## 2025-05-05 LAB
CFDNA.FET/CFDNA.TOTAL SFR FETUS: NORMAL %
CFDNA.FET/CFDNA.TOTAL SFR FETUS: NORMAL %
CITATION REF LAB TEST: NORMAL
FET 13+18+21+X+Y ANEUP PLAS.CFDNA: NEGATIVE
FET CHR 21 TS PLAS.CFDNA QL: NEGATIVE
FET CHR 21 TS PLAS.CFDNA QL: NEGATIVE
FET MS X RISK WBC.DNA+CFDNA QL: NOT DETECTED
FET SEX PLAS.CFDNA DOSAGE CFDNA: NORMAL
FET TS 13 RISK PLAS.CFDNA QL: NEGATIVE
FET X + Y ANEUP RISK PLAS.CFDNA SEQ-IMP: NOT DETECTED
GA EST FROM CONCEPTION DATE: NORMAL D
GESTATIONAL AGE > 9:: YES
LAB DIRECTOR NAME PROVIDER: NORMAL
LABORATORY COMMENT REPORT: NORMAL
LIMITATIONS OF THE TEST: NORMAL
NEGATIVE PREDICTIVE VALUE: NORMAL
PERFORMANCE CHARACTERISTICS: NORMAL
POSITIVE PREDICTIVE VALUE: NORMAL
REF LAB TEST METHOD: NORMAL
SL AMB NOTE:: NORMAL
TEST PERFORMANCE INFO SPEC: NORMAL

## 2025-05-05 NOTE — RESULT ENCOUNTER NOTE
I have reviewed the results of the NIPS which are low risk.  Please call patient and notify her of these reassuring results if she has not viewed on MyChart. Please ensure she is notified of recommendation of MSAFP to be ordered and followed up through her primary Obstetrician's office.      Thank you, Douglas Ashraf MD

## 2025-05-12 NOTE — ASSESSMENT & PLAN NOTE
- Continue PNV and LDASA  - Labor precautions reviewed  - Fetal movement reviewed  - Labs: UTD, 3 hour GTT normal. Will repeat at 28 weeks  -Pap: UTD  -Rh: A+  - Genetics: NIPS low risk, AFP ordered today   - Ultrasounds: L! Normal. L2 scheduled   - Tdap: 28 weeks  - Flu Shot: Will offer in season  - RSV:  Will offer during season (9/1-1/31) @ [id number]   - Rhogam: n/a  - Delivery: RLTCS  - Contraception: TBD  - Breastfeeding: TBD  - Pediatrician: TBD  -Delivery Consent & Packet: 30 weeks  -GBS: 36 weeks  - RTO in 4 weeks

## 2025-05-12 NOTE — PROGRESS NOTES
OB/GYN  PN Visit  Gemma Lee  [id number]  2025  2:37 PM  CASSI Lou    S: 34 y.o.  14w3d here for PN visit.   She denies contractions. She denies leakage of fluid and vaginal bleeding.   No fetal movement yet.   Her pregnancy is complicated by history of GDM, history of pre-eclampsia, and history of C/S x2.     O:  Pre-Mumtaz Vitals      Flowsheet Row Most Recent Value   Prenatal Assessment    Fetal Heart Rate 150   Movement Absent   Prenatal Vitals    Blood Pressure 120/74   Weight - Scale 85.7 kg (189 lb)   Urine Albumin/Glucose    Dilation/Effacement/Station    Vaginal Drainage    Edema           Wt=85.7 kg (189 lb); Body mass index is 32.44 kg/m².; TWG=4.082 kg (9 lb)  Physical Exam    General: Well appearing, no distress  Respiratory: Unlabored breathing  Abdomen: Soft, gravid, nontender  Extremities: Warm and well perfused.  Non tender.  OB exam completed:  +FHT.  Urine: -/-    A/P:    Problem List Items Addressed This Visit       Prenatal care, subsequent pregnancy in first trimester    - Continue PNV and LDASA  - Labor precautions reviewed  - Fetal movement reviewed  - Labs: UTD, 3 hour GTT normal. Will repeat at 28 weeks  -Pap: UTD  -Rh: A+  - Genetics: NIPS low risk, AFP ordered today   - Ultrasounds: L! Normal. L2 scheduled   - Tdap: 28 weeks  - Flu Shot: Will offer in season  - RSV:  Will offer during season (-) @ [id number]   - Rhogam: n/a  - Delivery: RLTCS  - Contraception: TBD  - Breastfeeding: TBD  - Pediatrician: TBD  -Delivery Consent & Packet: 30 weeks  -GBS: 36 weeks  - RTO in 4 weeks           Relevant Orders    Alpha fetoprotein, maternal    History of gestational diabetes mellitus (GDM) - Primary    Early 1 hr abnormal, 3 hr ordered         Previous  delivery, antepartum    3/20/2013  10/4/2018            CASSI Lou  2025  2:37 PM

## 2025-05-13 ENCOUNTER — ROUTINE PRENATAL (OUTPATIENT)
Dept: OBGYN CLINIC | Facility: CLINIC | Age: 35
End: 2025-05-13

## 2025-05-13 VITALS — SYSTOLIC BLOOD PRESSURE: 120 MMHG | WEIGHT: 189 LBS | BODY MASS INDEX: 32.44 KG/M2 | DIASTOLIC BLOOD PRESSURE: 74 MMHG

## 2025-05-13 DIAGNOSIS — Z86.32 HISTORY OF GESTATIONAL DIABETES MELLITUS (GDM): Primary | ICD-10-CM

## 2025-05-13 DIAGNOSIS — Z34.81 PRENATAL CARE, SUBSEQUENT PREGNANCY IN FIRST TRIMESTER: ICD-10-CM

## 2025-05-13 DIAGNOSIS — O34.219 PREVIOUS CESAREAN DELIVERY, ANTEPARTUM: ICD-10-CM

## 2025-05-27 ENCOUNTER — APPOINTMENT (OUTPATIENT)
Dept: LAB | Facility: HOSPITAL | Age: 35
End: 2025-05-27
Payer: COMMERCIAL

## 2025-05-27 DIAGNOSIS — Z34.81 PRENATAL CARE, SUBSEQUENT PREGNANCY IN FIRST TRIMESTER: ICD-10-CM

## 2025-05-27 PROCEDURE — 36415 COLL VENOUS BLD VENIPUNCTURE: CPT

## 2025-05-27 PROCEDURE — 82105 ALPHA-FETOPROTEIN SERUM: CPT

## 2025-05-29 ENCOUNTER — TELEPHONE (OUTPATIENT)
Age: 35
End: 2025-05-29

## 2025-05-29 NOTE — TELEPHONE ENCOUNTER
Patient rescheduled some appointments on my chart and has one on 6/2 and 6/12 and she is missing a 32 week one around 9/11 due to rescheduling to this to 6/12. This is FYI Thank you

## 2025-05-29 NOTE — TELEPHONE ENCOUNTER
ARCHIEOVM for patient to call back to fix scheduled appointment as her 06/02-06/12 appointments are 10 days apart she can keep one or the other but dose not need both. RANGEL ULLOA

## 2025-05-30 ENCOUNTER — RESULTS FOLLOW-UP (OUTPATIENT)
Dept: OBGYN CLINIC | Facility: CLINIC | Age: 35
End: 2025-05-30

## 2025-05-30 LAB
2ND TRIMESTER 4 SCREEN SERPL-IMP: NORMAL
AFP ADJ MOM SERPL: 0.54
AFP INTERP AMN-IMP: NORMAL
AFP INTERP SERPL-IMP: NORMAL
AFP INTERP SERPL-IMP: NORMAL
AFP SERPL-MCNC: 16.4 NG/ML
AGE AT DELIVERY: 34 YR
GA METHOD: NORMAL
GA: 16.4 WEEKS
IDDM PATIENT QL: NO
MULTIPLE PREGNANCY: NO
NEURAL TUBE DEFECT RISK FETUS: NORMAL %

## 2025-06-02 ENCOUNTER — OFFICE VISIT (OUTPATIENT)
Dept: OBGYN CLINIC | Facility: CLINIC | Age: 35
End: 2025-06-02
Payer: COMMERCIAL

## 2025-06-02 VITALS
DIASTOLIC BLOOD PRESSURE: 82 MMHG | WEIGHT: 188.2 LBS | BODY MASS INDEX: 32.3 KG/M2 | OXYGEN SATURATION: 97 % | HEART RATE: 89 BPM | SYSTOLIC BLOOD PRESSURE: 122 MMHG

## 2025-06-02 DIAGNOSIS — Z3A.17 17 WEEKS GESTATION OF PREGNANCY: Primary | ICD-10-CM

## 2025-06-02 PROCEDURE — 99213 OFFICE O/P EST LOW 20 MIN: CPT | Performed by: NURSE PRACTITIONER

## 2025-06-02 NOTE — PROGRESS NOTES
"  OB/GYN  PN Visit  Gemma Lee  [id number]  2025  10:02 AM   CASSI Bowens    S: Gemma Lee 34 y.o.  17w2d here for PN visit. She denies contractions. She denies leakage of fluid and vaginal bleeding. She is feeling flutters. She denies nausea, vomiting, headache, cramping, edema, domestic violence, and smoking, ETOH or drug. Her pregnancy is complicated by hx of GDM, hx of pre-eclampsia, hx of prior c/s x 2.     O:      Pre- Vitals      Flowsheet Row Most Recent Value   Prenatal Assessment    Prenatal Vitals    Blood Pressure 122/82   Weight - Scale 85.4 kg (188 lb 3.2 oz)   Urine Albumin/Glucose    Dilation/Effacement/Station    Vaginal Drainage    Edema             Fundal Height: 17 cm    A/P:    1. 17w2d GESTATION  - Continue PNV  - Labor precautions reviewed  - Fetal kick counts reviewed  - Labs: UTD  - Genetics: NIPT and AFP negative.   - Ultrasounds: NT scan on 25 normal. Anatomy scan scheduled for .   - Tdap: offer at 28 weeks.  - Flu Shot: Will offer in season  - RSV:  Will offer during season (-) @ [id number]   - Rhogam: N/A  - Delivery: RLTC  - Contraception: TBD  - Infant feeding: TBD  - Pediatrician: established    - RTO in 4 weeks    USE THIS FOR OVERVIEW? (Can be edited in the \"Problem list\"  Problem List          Surgery/Wound/Pain    Previous  delivery, antepartum    Overview   Pre-E  NR FHR            Obstetrics/Gynecology    Prenatal care, subsequent pregnancy in first trimester    History of gestational diabetes mellitus (GDM)       USE THIS FOR \"DAN\" CHARTING (can be edited in the note)  Assessment & Plan  17 weeks gestation of pregnancy             Future Appointments   Date Time Provider Department Center   2025 10:15 AM CASSI Bowens [facility]   2025 11:30 AM MD NIECY Jane [facility]   2025 10:45 AM  US 1 [facility]   2025  3:45 PM CASSI Bowens " [facility]   8/14/2025  3:30 PM Rosa Samson MD [facility]   8/29/2025  1:15 PM Debbie Devries MD [facility]   9/25/2025  3:45 PM Jovanna Garcia MD [facility]   10/8/2025  2:00 PM CASSI Bowens [facility]   10/16/2025 11:15 AM Ann Kovacs MD [facility]   10/24/2025  2:30 PM Debbie Devries MD [facility]   10/31/2025  2:30 PM Debbie Devries MD [facility]   11/7/2025 10:15 AM Maria E Johns MD [facility]         CASSI Bowens  6/2/2025  10:02 AM

## 2025-06-24 PROBLEM — O99.212 MATERNAL OBESITY, ANTEPARTUM, SECOND TRIMESTER: Status: ACTIVE | Noted: 2025-06-24

## 2025-06-24 PROBLEM — Z86.32 HISTORY OF GESTATIONAL DIABETES IN PRIOR PREGNANCY, CURRENTLY PREGNANT IN SECOND TRIMESTER: Status: ACTIVE | Noted: 2025-06-24

## 2025-06-24 PROBLEM — O09.292 HISTORY OF GESTATIONAL DIABETES IN PRIOR PREGNANCY, CURRENTLY PREGNANT IN SECOND TRIMESTER: Status: ACTIVE | Noted: 2025-06-24

## 2025-06-24 PROBLEM — O09.292 HX OF PREECLAMPSIA, PRIOR PREGNANCY, CURRENTLY PREGNANT, SECOND TRIMESTER: Status: ACTIVE | Noted: 2025-06-24

## 2025-06-24 PROBLEM — Z36.86 ENCOUNTER FOR ANTENATAL SCREENING FOR CERVICAL LENGTH: Status: ACTIVE | Noted: 2025-06-24

## 2025-06-24 NOTE — ASSESSMENT & PLAN NOTE
Normal detailed MFM fetal anatomy evaluation today  Interval growth ultrasound study recommended at 32 weeks gestation

## 2025-06-24 NOTE — PROGRESS NOTES
"FOLLOW-UP: MATERNAL-FETAL MEDICINE  Name: Gemma Lee      : 1990      MRN: [id number]  Encounter Provider:  US Candi HUBBARD  Encounter Date: 2025   Encounter department: [facility]  :  Assessment & Plan  20 weeks gestation of pregnancy         Encounter for  screening for cervical length       Normal cervical length by transvaginal sonography today  Maternal obesity, antepartum, second trimester       Normal detailed MFM fetal anatomy evaluation today  Interval growth ultrasound study recommended at 32 weeks gestation  Class 1 obesity         History of gestational diabetes in prior pregnancy, currently pregnant in second trimester       Elevated 1 hour post Glucola value 143 mg/dL 2025  Normal diagnostic 3-hour glucose tolerance test 2025  Repeat 3-hour GTT at about 28 weeks gestation  Hx of preeclampsia, prior pregnancy, currently pregnant, second trimester         Continue daily low-dose aspirin preeclampsia prophylaxis until 36 weeks gestation    History of Present Illness     Gemma Lee is a 34 y.o. female  at 20w3d who presents for anatomic survey and cervical length screening ultrasound.  Gemma has no complaints.  She reports fetal movement.  She denies vaginal bleeding or abdominal pain.  She continues to take low-dose aspirin daily to reduce her risk for preeclampsia.  Noninvasive prenatal testing revealed negative results.  MSAFP screening revealed a normal value of 0.54 MoM.  A prenatal office note of  was reviewed prior to the MFM encounter..    Objective   LMP 2025      Patient's last menstrual period was 2025.  Estimated Delivery Date: 2025, by Last Menstrual Period        Please refer to \"Imaging\" for ultrasound report from today's visit.       "

## 2025-06-24 NOTE — ASSESSMENT & PLAN NOTE
Elevated 1 hour post Glucola value 143 mg/dL April 18, 2025  Normal diagnostic 3-hour glucose tolerance test April 27, 2025  Repeat 3-hour GTT at about 28 weeks gestation

## 2025-06-25 ENCOUNTER — ROUTINE PRENATAL (OUTPATIENT)
Facility: HOSPITAL | Age: 35
End: 2025-06-25
Payer: COMMERCIAL

## 2025-06-25 ENCOUNTER — TELEPHONE (OUTPATIENT)
Age: 35
End: 2025-06-25

## 2025-06-25 ENCOUNTER — ANCILLARY PROCEDURE (OUTPATIENT)
Facility: HOSPITAL | Age: 35
End: 2025-06-25
Payer: COMMERCIAL

## 2025-06-25 VITALS
SYSTOLIC BLOOD PRESSURE: 130 MMHG | HEIGHT: 64 IN | HEART RATE: 96 BPM | DIASTOLIC BLOOD PRESSURE: 86 MMHG | WEIGHT: 189.82 LBS | BODY MASS INDEX: 32.41 KG/M2

## 2025-06-25 DIAGNOSIS — O09.292 HISTORY OF GESTATIONAL DIABETES IN PRIOR PREGNANCY, CURRENTLY PREGNANT IN SECOND TRIMESTER: ICD-10-CM

## 2025-06-25 DIAGNOSIS — Z36.86 ENCOUNTER FOR ANTENATAL SCREENING FOR CERVICAL LENGTH: ICD-10-CM

## 2025-06-25 DIAGNOSIS — E66.811 CLASS 1 OBESITY: ICD-10-CM

## 2025-06-25 DIAGNOSIS — Z3A.20 20 WEEKS GESTATION OF PREGNANCY: ICD-10-CM

## 2025-06-25 DIAGNOSIS — O99.212 MATERNAL OBESITY, ANTEPARTUM, SECOND TRIMESTER: Primary | ICD-10-CM

## 2025-06-25 DIAGNOSIS — O09.292 HX OF PREECLAMPSIA, PRIOR PREGNANCY, CURRENTLY PREGNANT, SECOND TRIMESTER: ICD-10-CM

## 2025-06-25 DIAGNOSIS — Z86.32 HISTORY OF GESTATIONAL DIABETES IN PRIOR PREGNANCY, CURRENTLY PREGNANT IN SECOND TRIMESTER: ICD-10-CM

## 2025-06-25 PROCEDURE — 76811 OB US DETAILED SNGL FETUS: CPT | Performed by: OBSTETRICS & GYNECOLOGY

## 2025-06-25 PROCEDURE — 99213 OFFICE O/P EST LOW 20 MIN: CPT | Performed by: OBSTETRICS & GYNECOLOGY

## 2025-06-25 PROCEDURE — 76817 TRANSVAGINAL US OBSTETRIC: CPT | Performed by: OBSTETRICS & GYNECOLOGY

## 2025-06-25 NOTE — TELEPHONE ENCOUNTER
Patient called in to review which medications are safe to take for heartburn in pregnancy. Reviewed that Tums, Mylanta and Maalox are safe over the counter options to try as needed. Return call if symptoms do not improve with use.

## 2025-06-25 NOTE — LETTER
2025     Rosa Samson MD  [address] [facility] [address]    Patient: Gemma Lee   YOB: 1990   Date of Visit: 2025       Dear Dr. Rosa Samson MD:    Thank you for referring Gemma Lee to me for evaluation. Below are my notes for this consultation.    If you have questions, please do not hesitate to call me. I look forward to following your patient along with you.         Sincerely,        Jose Alberto Cerrato MD        CC: No Recipients    Jose Alberto Cerrato MD  2025 10:38 AM  Sign when Signing Visit  FOLLOW-UP: MATERNAL-FETAL MEDICINE  Name: Gemma Lee      : 1990      MRN: [id number]  Encounter Provider:  US Candi HUBBARD  Encounter Date: 2025   Encounter department: [facility]  :  Assessment & Plan  20 weeks gestation of pregnancy         Encounter for  screening for cervical length       Normal cervical length by transvaginal sonography today  Maternal obesity, antepartum, second trimester       Normal detailed MFM fetal anatomy evaluation today  Interval growth ultrasound study recommended at 32 weeks gestation  Class 1 obesity         History of gestational diabetes in prior pregnancy, currently pregnant in second trimester       Elevated 1 hour post Glucola value 143 mg/dL 2025  Normal diagnostic 3-hour glucose tolerance test 2025  Repeat 3-hour GTT at about 28 weeks gestation  Hx of preeclampsia, prior pregnancy, currently pregnant, second trimester         Continue daily low-dose aspirin preeclampsia prophylaxis until 36 weeks gestation    History of Present Illness    Gemma Lee is a 34 y.o. female  at 20w3d who presents for anatomic survey and cervical length screening ultrasound.  Gemma has no complaints.  She reports fetal movement.  She denies vaginal bleeding or abdominal pain.  She continues to take low-dose aspirin daily to reduce her risk for preeclampsia.   "Noninvasive prenatal testing revealed negative results.  MSAFP screening revealed a normal value of 0.54 MoM.  A prenatal office note of June 2 was reviewed prior to the MFM encounter..    Objective  LMP 02/01/2025      Patient's last menstrual period was 02/01/2025.  Estimated Delivery Date: 11/8/2025, by Last Menstrual Period        Please refer to \"Imaging\" for ultrasound report from today's visit.       "

## 2025-07-01 ENCOUNTER — TELEPHONE (OUTPATIENT)
Dept: OBGYN CLINIC | Facility: CLINIC | Age: 35
End: 2025-07-01

## 2025-07-17 ENCOUNTER — ROUTINE PRENATAL (OUTPATIENT)
Dept: OBGYN CLINIC | Facility: CLINIC | Age: 35
End: 2025-07-17

## 2025-07-17 VITALS — SYSTOLIC BLOOD PRESSURE: 120 MMHG | WEIGHT: 190 LBS | BODY MASS INDEX: 32.61 KG/M2 | DIASTOLIC BLOOD PRESSURE: 80 MMHG

## 2025-07-17 DIAGNOSIS — Z36.89 ENCOUNTER FOR OTHER SPECIFIED ANTENATAL SCREENING: ICD-10-CM

## 2025-07-17 DIAGNOSIS — Z3A.24 24 WEEKS GESTATION OF PREGNANCY: Primary | ICD-10-CM

## 2025-07-17 PROCEDURE — PNV: Performed by: NURSE PRACTITIONER

## 2025-07-17 NOTE — PROGRESS NOTES
"  OB/GYN  PN Visit  Gemma Lee  [id number]  2025  3:43 PM   CASSI Bowens    S: Gemma Lee 34 y.o.  23w5d here for PN visit. She denies contractions. She denies leakage of fluid and vaginal bleeding. She has good fetal movement. She endorses nausea. Denies vomiting, headache, cramping, edema, domestic violence, and smoking, ETOH or drug. Her pregnancy is complicated by hx of GDM, hx of pre-eclampsia, hx of prior c/s x 2.     O:      Pre- Vitals      Flowsheet Row Most Recent Value   Prenatal Assessment    Fetal Heart Rate 142   Fundal Height (cm) 24 cm   Movement Present   Prenatal Vitals    Blood Pressure 120/80   Weight - Scale 86.2 kg (190 lb)   Urine Albumin/Glucose    Dilation/Effacement/Station    Vaginal Drainage    Edema             A/P:    1. 23w5d GESTATION  - Continue PNV  - Labs: Orders placed  - Genetics: NIPT and AFP negative.   - Ultrasounds: Normal midtrimester MFM detailed fetal ultrasound evaluation on 25. Interval fetal growth assessment at 32 weeks gestation on 25.  - Tdap: offer at 28 weeks  - Flu Shot: Will offer in season  - RSV:  Will offer during season (-) @ [id number]   - Rhogam: N/A  - Delivery: RLTC  - Contraception: rhythm method  - Infant feeding: formula  - Pediatrician: Established  - RTO in 4 weeks or sooner if needed     USE THIS FOR OVERVIEW? (Can be edited in the \"Problem list\"  Problem List          Surgery/Wound/Pain    Previous  delivery, antepartum    Overview   Pre-E  NR FHR            Obstetrics/Gynecology    Prenatal care, subsequent pregnancy in first trimester    History of gestational diabetes mellitus (GDM)    Hx of preeclampsia, prior pregnancy, currently pregnant, second trimester    History of gestational diabetes in prior pregnancy, currently pregnant in second trimester    Maternal obesity, antepartum, second trimester       Other    Encounter for  screening for cervical length       USE THIS FOR \"ORA\" " CHARTING (can be edited in the note)  Assessment & Plan  24 weeks gestation of pregnancy         Encounter for other specified  screening    Orders:    Anemia Panel w/Reflex, OB; Future    CBC and differential    RPR-Syphilis Screening (Total Syphilis IGG/IGM); Future    Type and screen; Future    Glucose KELLY 3HR 100GM PREG PTS; Future        Future Appointments   Date Time Provider Department Center   2025  3:45 PM CASSI Bowens [facility]   2025  3:30 PM Rosa Samson MD [facility]   2025  2:30 PM CASSI Bowens [facility]   2025  3:00 PM  US 3 [facility]   2025  3:45 PM Jovanna Garcia MD [facility]   10/8/2025  2:00 PM CASSI Bowens [facility]   10/16/2025  3:30 PM Rosa Samson MD [facility]   10/23/2025  3:45 PM Jovanna Garcia MD [facility]   10/31/2025  9:45 AM Jovanna Garcia MD [facility]   2025 10:15 AM Maria E Johns MD [facility]         CASSI Bowens  2025  3:43 PM

## 2025-08-08 ENCOUNTER — APPOINTMENT (OUTPATIENT)
Dept: LAB | Facility: HOSPITAL | Age: 35
End: 2025-08-08
Attending: NURSE PRACTITIONER
Payer: COMMERCIAL

## 2025-08-12 ENCOUNTER — OFFICE VISIT (OUTPATIENT)
Facility: HOSPITAL | Age: 35
End: 2025-08-12
Payer: COMMERCIAL

## 2025-08-12 ENCOUNTER — PATIENT MESSAGE (OUTPATIENT)
Facility: HOSPITAL | Age: 35
End: 2025-08-12

## 2025-08-14 ENCOUNTER — ROUTINE PRENATAL (OUTPATIENT)
Dept: OBGYN CLINIC | Facility: CLINIC | Age: 35
End: 2025-08-14
Payer: COMMERCIAL

## 2025-08-15 ENCOUNTER — TELEPHONE (OUTPATIENT)
Dept: OBGYN CLINIC | Facility: CLINIC | Age: 35
End: 2025-08-15